# Patient Record
Sex: MALE | Race: WHITE | NOT HISPANIC OR LATINO | URBAN - METROPOLITAN AREA
[De-identification: names, ages, dates, MRNs, and addresses within clinical notes are randomized per-mention and may not be internally consistent; named-entity substitution may affect disease eponyms.]

---

## 2018-02-27 ENCOUNTER — OFFICE (OUTPATIENT)
Dept: URBAN - METROPOLITAN AREA CLINIC 75 | Facility: CLINIC | Age: 38
End: 2018-02-27

## 2018-02-27 VITALS
HEIGHT: 67 IN | DIASTOLIC BLOOD PRESSURE: 78 MMHG | HEART RATE: 72 BPM | WEIGHT: 142 LBS | SYSTOLIC BLOOD PRESSURE: 120 MMHG

## 2018-02-27 DIAGNOSIS — Z80.9 FAMILY HISTORY OF MALIGNANT NEOPLASM, UNSPECIFIED: ICD-10-CM

## 2018-02-27 DIAGNOSIS — R12 HEARTBURN: ICD-10-CM

## 2018-02-27 PROCEDURE — 99203 OFFICE O/P NEW LOW 30 MIN: CPT | Performed by: INTERNAL MEDICINE

## 2018-02-27 RX ORDER — PANTOPRAZOLE SODIUM 40 MG/1
TABLET, DELAYED RELEASE ORAL
Qty: 30 | Refills: 1 | Status: COMPLETED
End: 2020-01-13

## 2018-02-27 NOTE — SERVICEHPINOTES
Mr. Vigil is seen for abnormal rectal exam.  He tells me about a month ago his wife felt what she thought was a rectal mass.  His wife works with cancer patients.  He denies rectal pain, rectal bleeding, diarrhea, or constipation.  He denies hemorrhoids or rectal pressure.  He will have a bowel movement 4-5 times a week and that is soft.  His weight is stable in fact he has recently gained weight. On further discussion, he goes on to tell me he will have heartburn 3-4 times a week worse lately.  He will take Tums with no relief.  The heartburn comes on in the evening and right before bed.  He also reports 2 separate occasions of a scratching sensation mid throat.  He feels this was related to "screaming and yawning."  He does report coughing and throat clearing daily after he eats lunch.  He has not tried an H2 blocker or PPI.He does not smoke.  He drinks 4-5 alcoholic beverages a week.  He denies any history of liver disease.  He has never had abdominal surgery.  He does not use NSAIDs.  He has a paternal uncle with colon polyps.  His paternal grandmother and grandfather both had colon cancer.  He is unsure of what age they were diagnosed.  He does not look acutely ill.  BR

## 2018-02-27 NOTE — SERVICENOTES
Patient seen and examined with SARAH Harris.  We reviewed history, we both performed physical exam, and I agree with assessment/plan as formulated on visit note

## 2018-04-10 VITALS
DIASTOLIC BLOOD PRESSURE: 52 MMHG | RESPIRATION RATE: 27 BRPM | HEART RATE: 85 BPM | RESPIRATION RATE: 31 BRPM | HEIGHT: 67 IN | DIASTOLIC BLOOD PRESSURE: 78 MMHG | OXYGEN SATURATION: 98 % | DIASTOLIC BLOOD PRESSURE: 77 MMHG | RESPIRATION RATE: 12 BRPM | HEART RATE: 87 BPM | DIASTOLIC BLOOD PRESSURE: 60 MMHG | HEART RATE: 70 BPM | SYSTOLIC BLOOD PRESSURE: 98 MMHG | HEART RATE: 49 BPM | RESPIRATION RATE: 20 BRPM | SYSTOLIC BLOOD PRESSURE: 110 MMHG | SYSTOLIC BLOOD PRESSURE: 112 MMHG | HEART RATE: 64 BPM | SYSTOLIC BLOOD PRESSURE: 114 MMHG | HEART RATE: 79 BPM | HEART RATE: 72 BPM | DIASTOLIC BLOOD PRESSURE: 83 MMHG | HEART RATE: 62 BPM | SYSTOLIC BLOOD PRESSURE: 123 MMHG | DIASTOLIC BLOOD PRESSURE: 73 MMHG | SYSTOLIC BLOOD PRESSURE: 106 MMHG | HEART RATE: 95 BPM | OXYGEN SATURATION: 100 % | HEART RATE: 71 BPM | SYSTOLIC BLOOD PRESSURE: 107 MMHG | RESPIRATION RATE: 18 BRPM | SYSTOLIC BLOOD PRESSURE: 116 MMHG | DIASTOLIC BLOOD PRESSURE: 72 MMHG | SYSTOLIC BLOOD PRESSURE: 115 MMHG | RESPIRATION RATE: 17 BRPM | DIASTOLIC BLOOD PRESSURE: 65 MMHG | HEART RATE: 82 BPM | DIASTOLIC BLOOD PRESSURE: 71 MMHG | TEMPERATURE: 96.5 F | RESPIRATION RATE: 25 BRPM | HEART RATE: 74 BPM | WEIGHT: 145 LBS | HEART RATE: 80 BPM | DIASTOLIC BLOOD PRESSURE: 76 MMHG | RESPIRATION RATE: 26 BRPM | SYSTOLIC BLOOD PRESSURE: 128 MMHG | OXYGEN SATURATION: 99 %

## 2018-04-16 ENCOUNTER — OFFICE (OUTPATIENT)
Dept: URBAN - METROPOLITAN AREA PATHOLOGY 4 | Facility: PATHOLOGY | Age: 38
End: 2018-04-16
Payer: COMMERCIAL

## 2018-04-16 ENCOUNTER — AMBULATORY SURGICAL CENTER (OUTPATIENT)
Dept: URBAN - METROPOLITAN AREA SURGERY 17 | Facility: SURGERY | Age: 38
End: 2018-04-16

## 2018-04-16 DIAGNOSIS — Z80.9 FAMILY HISTORY OF MALIGNANT NEOPLASM, UNSPECIFIED: ICD-10-CM

## 2018-04-16 DIAGNOSIS — K62.1 RECTAL POLYP: ICD-10-CM

## 2018-04-16 DIAGNOSIS — K21.0 GASTRO-ESOPHAGEAL REFLUX DISEASE WITH ESOPHAGITIS: ICD-10-CM

## 2018-04-16 DIAGNOSIS — R12 HEARTBURN: ICD-10-CM

## 2018-04-16 DIAGNOSIS — K57.30 DIVERTICULOSIS OF LARGE INTESTINE WITHOUT PERFORATION OR ABS: ICD-10-CM

## 2018-04-16 DIAGNOSIS — K44.9 DIAPHRAGMATIC HERNIA WITHOUT OBSTRUCTION OR GANGRENE: ICD-10-CM

## 2018-04-16 DIAGNOSIS — K29.70 GASTRITIS, UNSPECIFIED, WITHOUT BLEEDING: ICD-10-CM

## 2018-04-16 LAB
GI HISTOLOGY: A. UNSPECIFIED: (no result)
GI HISTOLOGY: B. UNSPECIFIED: (no result)
GI HISTOLOGY: C. UNSPECIFIED: (no result)
GI HISTOLOGY: PDF REPORT: (no result)

## 2018-04-16 PROCEDURE — 43239 EGD BIOPSY SINGLE/MULTIPLE: CPT | Performed by: INTERNAL MEDICINE

## 2018-04-16 PROCEDURE — 45380 COLONOSCOPY AND BIOPSY: CPT | Performed by: INTERNAL MEDICINE

## 2018-04-16 PROCEDURE — 88305 TISSUE EXAM BY PATHOLOGIST: CPT | Performed by: INTERNAL MEDICINE

## 2018-04-16 RX ADMIN — PROPOFOL 50 MG: 10 INJECTION, EMULSION INTRAVENOUS at 12:02

## 2018-04-16 RX ADMIN — PROPOFOL 50 MG: 10 INJECTION, EMULSION INTRAVENOUS at 12:12

## 2018-04-16 RX ADMIN — PROPOFOL 50 MG: 10 INJECTION, EMULSION INTRAVENOUS at 12:18

## 2018-04-16 RX ADMIN — PROPOFOL 50 MG: 10 INJECTION, EMULSION INTRAVENOUS at 12:11

## 2018-04-16 RX ADMIN — PROPOFOL 50 MG: 10 INJECTION, EMULSION INTRAVENOUS at 11:56

## 2018-04-16 RX ADMIN — LIDOCAINE HYDROCHLORIDE 25 MG: 10 INJECTION, SOLUTION EPIDURAL; INFILTRATION; INTRACAUDAL; PERINEURAL at 11:55

## 2018-04-16 RX ADMIN — PROPOFOL 50 MG: 10 INJECTION, EMULSION INTRAVENOUS at 12:17

## 2018-04-16 RX ADMIN — PROPOFOL 25 MG: 10 INJECTION, EMULSION INTRAVENOUS at 11:55

## 2018-04-16 RX ADMIN — PROPOFOL 50 MG: 10 INJECTION, EMULSION INTRAVENOUS at 12:04

## 2018-04-16 RX ADMIN — PROPOFOL 100 MG: 10 INJECTION, EMULSION INTRAVENOUS at 11:55

## 2018-04-16 RX ADMIN — PROPOFOL 25 MG: 10 INJECTION, EMULSION INTRAVENOUS at 11:56

## 2018-04-16 RX ADMIN — PROPOFOL 50 MG: 10 INJECTION, EMULSION INTRAVENOUS at 11:55

## 2018-04-16 RX ADMIN — PROPOFOL 50 MG: 10 INJECTION, EMULSION INTRAVENOUS at 11:59

## 2019-04-02 ENCOUNTER — OFFICE (OUTPATIENT)
Dept: URBAN - METROPOLITAN AREA CLINIC 75 | Facility: CLINIC | Age: 39
End: 2019-04-02

## 2019-04-02 VITALS
HEIGHT: 67 IN | SYSTOLIC BLOOD PRESSURE: 121 MMHG | HEART RATE: 85 BPM | DIASTOLIC BLOOD PRESSURE: 79 MMHG | WEIGHT: 150 LBS

## 2019-04-02 DIAGNOSIS — K29.70 GASTRITIS, UNSPECIFIED, WITHOUT BLEEDING: ICD-10-CM

## 2019-04-02 DIAGNOSIS — K62.1 RECTAL POLYP: ICD-10-CM

## 2019-04-02 DIAGNOSIS — R12 HEARTBURN: ICD-10-CM

## 2019-04-02 DIAGNOSIS — K44.9 DIAPHRAGMATIC HERNIA WITHOUT OBSTRUCTION OR GANGRENE: ICD-10-CM

## 2019-04-02 DIAGNOSIS — Z80.9 FAMILY HISTORY OF MALIGNANT NEOPLASM, UNSPECIFIED: ICD-10-CM

## 2019-04-02 DIAGNOSIS — K57.30 DIVERTICULOSIS OF LARGE INTESTINE WITHOUT PERFORATION OR ABS: ICD-10-CM

## 2019-04-02 PROCEDURE — 99213 OFFICE O/P EST LOW 20 MIN: CPT | Performed by: NURSE PRACTITIONER

## 2019-04-02 RX ORDER — PANTOPRAZOLE SODIUM 40 MG/1
TABLET, DELAYED RELEASE ORAL
Qty: 30 | Refills: 1 | Status: COMPLETED
End: 2020-01-13

## 2020-11-24 ENCOUNTER — TELEHEALTH PROVIDED OTHER THAN IN PATIENT'S HOME (OUTPATIENT)
Dept: URBAN - METROPOLITAN AREA TELEHEALTH 6 | Facility: TELEHEALTH | Age: 40
End: 2020-11-24
Payer: COMMERCIAL

## 2020-11-24 VITALS — WEIGHT: 150 LBS | HEIGHT: 67 IN

## 2020-11-24 DIAGNOSIS — K57.30 DIVERTICULOSIS OF LARGE INTESTINE WITHOUT PERFORATION OR ABS: ICD-10-CM

## 2020-11-24 DIAGNOSIS — K21.00 GASTRO-ESOPHAGEAL REFLUX DISEASE WITH ESOPHAGITIS, WITHOUT B: ICD-10-CM

## 2020-11-24 PROCEDURE — 99213 OFFICE O/P EST LOW 20 MIN: CPT | Performed by: NURSE PRACTITIONER

## 2020-11-24 RX ORDER — PANTOPRAZOLE 20 MG/1
20 TABLET, DELAYED RELEASE ORAL
Qty: 30 | Refills: 6 | Status: COMPLETED
End: 2024-08-07

## 2022-02-24 ENCOUNTER — OFFICE (OUTPATIENT)
Dept: URBAN - METROPOLITAN AREA CLINIC 75 | Facility: CLINIC | Age: 42
End: 2022-02-24

## 2022-02-24 VITALS — HEIGHT: 67 IN | DIASTOLIC BLOOD PRESSURE: 80 MMHG | WEIGHT: 154 LBS | SYSTOLIC BLOOD PRESSURE: 124 MMHG

## 2022-02-24 DIAGNOSIS — K21.00 GASTRO-ESOPHAGEAL REFLUX DISEASE WITH ESOPHAGITIS, WITHOUT B: ICD-10-CM

## 2022-02-24 DIAGNOSIS — K44.9 DIAPHRAGMATIC HERNIA WITHOUT OBSTRUCTION OR GANGRENE: ICD-10-CM

## 2022-02-24 DIAGNOSIS — Z80.9 FAMILY HISTORY OF MALIGNANT NEOPLASM, UNSPECIFIED: ICD-10-CM

## 2022-02-24 DIAGNOSIS — K57.30 DIVERTICULOSIS OF LARGE INTESTINE WITHOUT PERFORATION OR ABS: ICD-10-CM

## 2022-02-24 DIAGNOSIS — K29.70 GASTRITIS, UNSPECIFIED, WITHOUT BLEEDING: ICD-10-CM

## 2022-02-24 PROCEDURE — 99214 OFFICE O/P EST MOD 30 MIN: CPT | Performed by: NURSE PRACTITIONER

## 2022-02-24 RX ORDER — PANTOPRAZOLE 20 MG/1
20 TABLET, DELAYED RELEASE ORAL
Qty: 30 | Refills: 6 | Status: COMPLETED
End: 2024-08-07